# Patient Record
Sex: FEMALE | Race: WHITE | NOT HISPANIC OR LATINO
[De-identification: names, ages, dates, MRNs, and addresses within clinical notes are randomized per-mention and may not be internally consistent; named-entity substitution may affect disease eponyms.]

---

## 2020-02-28 ENCOUNTER — APPOINTMENT (OUTPATIENT)
Dept: OBGYN | Facility: CLINIC | Age: 45
End: 2020-02-28
Payer: COMMERCIAL

## 2020-02-28 VITALS — WEIGHT: 120 LBS | BODY MASS INDEX: 20.49 KG/M2 | HEIGHT: 64 IN

## 2020-02-28 DIAGNOSIS — Z32.02 ENCOUNTER FOR PREGNANCY TEST, RESULT NEGATIVE: ICD-10-CM

## 2020-02-28 DIAGNOSIS — Z78.9 OTHER SPECIFIED HEALTH STATUS: ICD-10-CM

## 2020-02-28 DIAGNOSIS — T85.43XD LEAKAGE OF BREAST PROSTHESIS AND IMPLANT, SUBSEQUENT ENCOUNTER: ICD-10-CM

## 2020-02-28 DIAGNOSIS — T83.32XA DISPLACEMENT OF INTRAUTERINE CONTRACEPTIVE DEVICE, INITIAL ENCOUNTER: ICD-10-CM

## 2020-02-28 DIAGNOSIS — Z80.3 FAMILY HISTORY OF MALIGNANT NEOPLASM OF BREAST: ICD-10-CM

## 2020-02-28 PROCEDURE — 99213 OFFICE O/P EST LOW 20 MIN: CPT | Mod: 25

## 2020-02-28 PROCEDURE — 99386 PREV VISIT NEW AGE 40-64: CPT

## 2020-02-28 PROCEDURE — 99000 SPECIMEN HANDLING OFFICE-LAB: CPT

## 2020-02-28 NOTE — PHYSICAL EXAM
[Alert] : alert [Awake] : awake [Soft] : soft [Oriented x3] : oriented to person, place, and time [Labia Majora] : labia major [No Bleeding] : there was no active vaginal bleeding [Labia Minora] : labia minora [Normal] : clitoris [Pap Obtained] : a Pap smear was performed [Uterine Adnexae] : were not tender and not enlarged [RRR, No Murmurs] : RRR, no murmurs [CTAB] : CTAB [Acute Distress] : no acute distress [Thyroid Nodule] : no thyroid nodule [LAD] : no lymphadenopathy [Tender] : non tender [Goiter] : no goiter [H/Smegaly] : no hepatosplenomegaly [Distended] : not distended [Depressed Mood] : not depressed [Flat Affect] : affect not flat [IUD String] : did not have an IUD string protruding out [Adnexa Tenderness] : were not tender [Motion Tenderness] : there was no cervical motion tenderness [Tenderness] : nontender [Ovarian Mass (___ Cm)] : there were no adnexal masses [FreeTextEntry5] : unable to tease IUD string out of os with cytobrush

## 2020-02-28 NOTE — CHIEF COMPLAINT
[Annual Visit] : annual visit [FreeTextEntry1] : Pt had recent breast surgery d/t implant problem. Sore today, no BP done\par Pt desires STI testing today\par Pt [facility] removal[facility]

## 2020-02-28 NOTE — HISTORY OF PRESENT ILLNESS
[Last Mammogram ___] : Last Mammogram was [unfilled] [Last Pap ___] : Last cervical pap smear was [unfilled] [Reproductive Age] : is of reproductive age [Definite:  ___ (Date)] : the last menstrual period was [unfilled] [Sexually Active] : is not sexually active

## 2020-03-02 ENCOUNTER — APPOINTMENT (OUTPATIENT)
Dept: OBGYN | Facility: CLINIC | Age: 45
End: 2020-03-02
Payer: COMMERCIAL

## 2020-03-02 ENCOUNTER — APPOINTMENT (OUTPATIENT)
Dept: OBGYN | Facility: CLINIC | Age: 45
End: 2020-03-02

## 2020-03-02 ENCOUNTER — ASOB RESULT (OUTPATIENT)
Age: 45
End: 2020-03-02

## 2020-03-02 VITALS — BODY MASS INDEX: 20.49 KG/M2 | WEIGHT: 120 LBS | HEIGHT: 64 IN

## 2020-03-02 DIAGNOSIS — Z97.5 PRESENCE OF (INTRAUTERINE) CONTRACEPTIVE DEVICE: ICD-10-CM

## 2020-03-02 LAB
C TRACH RRNA SPEC QL NAA+PROBE: NOT DETECTED
HBV SURFACE AG SER QL: NONREACTIVE
HCG UR QL: NEGATIVE
HCV AB SER QL: NONREACTIVE
HCV S/CO RATIO: 0.15 S/CO
HIV1+2 AB SPEC QL IA.RAPID: NONREACTIVE
HPV HIGH+LOW RISK DNA PNL CVX: NOT DETECTED
N GONORRHOEA RRNA SPEC QL NAA+PROBE: NOT DETECTED
QUALITY CONTROL: YES
SOURCE TP AMPLIFICATION: NORMAL
T PALLIDUM AB SER QL IA: NEGATIVE

## 2020-03-02 PROCEDURE — 58301 REMOVE INTRAUTERINE DEVICE: CPT

## 2020-03-02 PROCEDURE — 76830 TRANSVAGINAL US NON-OB: CPT

## 2020-03-02 PROCEDURE — 58300 INSERT INTRAUTERINE DEVICE: CPT

## 2020-03-02 PROCEDURE — 76856 US EXAM PELVIC COMPLETE: CPT | Mod: 59

## 2020-03-02 PROCEDURE — ZZZZZ: CPT

## 2020-03-02 RX ORDER — LEVONORGESTREL 52 MG/1
20 INTRAUTERINE DEVICE INTRAUTERINE
Refills: 0 | Status: COMPLETED | OUTPATIENT
Start: 2020-03-02

## 2020-03-02 RX ADMIN — LEVONORGESTREL 0 MCG/24HR: 52 INTRAUTERINE DEVICE INTRAUTERINE at 00:00

## 2020-03-04 ENCOUNTER — APPOINTMENT (OUTPATIENT)
Dept: OBGYN | Facility: CLINIC | Age: 45
End: 2020-03-04

## 2020-03-05 LAB — CYTOLOGY CVX/VAG DOC THIN PREP: NORMAL

## 2020-04-17 ENCOUNTER — APPOINTMENT (OUTPATIENT)
Dept: OBGYN | Facility: CLINIC | Age: 45
End: 2020-04-17

## 2020-12-02 ENCOUNTER — TRANSCRIPTION ENCOUNTER (OUTPATIENT)
Age: 45
End: 2020-12-02

## 2021-01-12 ENCOUNTER — NON-APPOINTMENT (OUTPATIENT)
Age: 46
End: 2021-01-12

## 2021-01-12 ENCOUNTER — APPOINTMENT (OUTPATIENT)
Dept: FAMILY MEDICINE | Facility: CLINIC | Age: 46
End: 2021-01-12
Payer: COMMERCIAL

## 2021-01-12 VITALS
OXYGEN SATURATION: 99 % | TEMPERATURE: 98 F | HEART RATE: 91 BPM | SYSTOLIC BLOOD PRESSURE: 102 MMHG | HEIGHT: 64 IN | WEIGHT: 120 LBS | DIASTOLIC BLOOD PRESSURE: 70 MMHG | RESPIRATION RATE: 16 BRPM | BODY MASS INDEX: 20.49 KG/M2

## 2021-01-12 DIAGNOSIS — M54.2 CERVICALGIA: ICD-10-CM

## 2021-01-12 DIAGNOSIS — Z00.00 ENCOUNTER FOR GENERAL ADULT MEDICAL EXAMINATION W/OUT ABNORMAL FINDINGS: ICD-10-CM

## 2021-01-12 DIAGNOSIS — Z82.3 FAMILY HISTORY OF STROKE: ICD-10-CM

## 2021-01-12 DIAGNOSIS — Z83.438 FAMILY HISTORY OF OTHER DISORDER OF LIPOPROTEIN METABOLISM AND OTHER LIPIDEMIA: ICD-10-CM

## 2021-01-12 DIAGNOSIS — G89.29 CERVICALGIA: ICD-10-CM

## 2021-01-12 LAB
BILIRUB UR QL STRIP: NORMAL
CLARITY UR: CLEAR
COLLECTION METHOD: NORMAL
GLUCOSE UR-MCNC: NORMAL
HCG UR QL: 0.2 EU/DL
HGB UR QL STRIP.AUTO: NORMAL
KETONES UR-MCNC: NORMAL
LEUKOCYTE ESTERASE UR QL STRIP: NORMAL
NITRITE UR QL STRIP: NORMAL
PH UR STRIP: 6
SP GR UR STRIP: 1.01

## 2021-01-12 PROCEDURE — 99072 ADDL SUPL MATRL&STAF TM PHE: CPT

## 2021-01-12 PROCEDURE — 81003 URINALYSIS AUTO W/O SCOPE: CPT | Mod: QW

## 2021-01-12 PROCEDURE — 99396 PREV VISIT EST AGE 40-64: CPT | Mod: 25

## 2021-01-12 PROCEDURE — 36415 COLL VENOUS BLD VENIPUNCTURE: CPT

## 2021-01-12 NOTE — PHYSICAL EXAM
[No Acute Distress] : no acute distress [Well Nourished] : well nourished [Well Developed] : well developed [Well-Appearing] : well-appearing [Normal Sclera/Conjunctiva] : normal sclera/conjunctiva [PERRL] : pupils equal round and reactive to light [EOMI] : extraocular movements intact [Normal Outer Ear/Nose] : the outer ears and nose were normal in appearance [No JVD] : no jugular venous distention [No Lymphadenopathy] : no lymphadenopathy [Supple] : supple [Thyroid Normal, No Nodules] : the thyroid was normal and there were no nodules present [No Respiratory Distress] : no respiratory distress  [No Accessory Muscle Use] : no accessory muscle use [Clear to Auscultation] : lungs were clear to auscultation bilaterally [Normal Rate] : normal rate  [Regular Rhythm] : with a regular rhythm [Normal S1, S2] : normal S1 and S2 [No Murmur] : no murmur heard [No Carotid Bruits] : no carotid bruits [No Abdominal Bruit] : a ~M bruit was not heard ~T in the abdomen [No Varicosities] : no varicosities [Pedal Pulses Present] : the pedal pulses are present [No Edema] : there was no peripheral edema [No Palpable Aorta] : no palpable aorta [No Extremity Clubbing/Cyanosis] : no extremity clubbing/cyanosis [Soft] : abdomen soft [Non Tender] : non-tender [Non-distended] : non-distended [No Masses] : no abdominal mass palpated [No HSM] : no HSM [Normal Bowel Sounds] : normal bowel sounds [Normal Posterior Cervical Nodes] : no posterior cervical lymphadenopathy [Normal Anterior Cervical Nodes] : no anterior cervical lymphadenopathy [No CVA Tenderness] : no CVA  tenderness [No Spinal Tenderness] : no spinal tenderness [No Joint Swelling] : no joint swelling [Grossly Normal Strength/Tone] : grossly normal strength/tone [No Rash] : no rash [Coordination Grossly Intact] : coordination grossly intact [No Focal Deficits] : no focal deficits [Normal Gait] : normal gait [Memory Grossly Normal] : memory grossly normal [Normal Affect] : the affect was normal [Alert and Oriented x3] : oriented to person, place, and time [Normal Insight/Judgement] : insight and judgment were intact [de-identified] : appears anxious

## 2021-01-12 NOTE — HISTORY OF PRESENT ILLNESS
[FreeTextEntry1] : pt states she has restricted movement in base of skull to neck xfew months \par cpe\par discuss anxiety and depression  [de-identified] : Hermila presents for CPE\par She has anxiety and depression symptoms because she is currently living in a hotel with her mother ( waiting for her new home to be ready) and is getting . She does have a good relationship with her boyfriend of 1 year. Does not want prozac as she had prior anorgasmia with it\par Hermila also c/o stiff and sore neck muscles x 3+ months

## 2021-01-12 NOTE — HEALTH RISK ASSESSMENT
[0] : 1) Little interest or pleasure doing things: Not at all (0) [3] : 2) Feeling down, depressed, or hopeless for nearly every day (3)

## 2021-01-13 ENCOUNTER — APPOINTMENT (OUTPATIENT)
Dept: RADIOLOGY | Facility: CLINIC | Age: 46
End: 2021-01-13
Payer: COMMERCIAL

## 2021-01-13 ENCOUNTER — RESULT REVIEW (OUTPATIENT)
Age: 46
End: 2021-01-13

## 2021-01-13 ENCOUNTER — APPOINTMENT (OUTPATIENT)
Dept: MAMMOGRAPHY | Facility: CLINIC | Age: 46
End: 2021-01-13
Payer: COMMERCIAL

## 2021-01-13 ENCOUNTER — TRANSCRIPTION ENCOUNTER (OUTPATIENT)
Age: 46
End: 2021-01-13

## 2021-01-13 ENCOUNTER — APPOINTMENT (OUTPATIENT)
Dept: ULTRASOUND IMAGING | Facility: CLINIC | Age: 46
End: 2021-01-13
Payer: COMMERCIAL

## 2021-01-13 LAB
ALBUMIN SERPL ELPH-MCNC: 4.9 G/DL
ALP BLD-CCNC: 63 U/L
ALT SERPL-CCNC: 18 U/L
ANION GAP SERPL CALC-SCNC: 15 MMOL/L
AST SERPL-CCNC: 23 U/L
BASOPHILS # BLD AUTO: 0.05 K/UL
BASOPHILS NFR BLD AUTO: 0.7 %
BILIRUB SERPL-MCNC: 0.4 MG/DL
BUN SERPL-MCNC: 12 MG/DL
CALCIUM SERPL-MCNC: 10 MG/DL
CHLORIDE SERPL-SCNC: 104 MMOL/L
CHOLEST SERPL-MCNC: 222 MG/DL
CO2 SERPL-SCNC: 21 MMOL/L
CREAT SERPL-MCNC: 0.69 MG/DL
EOSINOPHIL # BLD AUTO: 0.01 K/UL
EOSINOPHIL NFR BLD AUTO: 0.1 %
GLUCOSE SERPL-MCNC: 96 MG/DL
HCT VFR BLD CALC: 43.2 %
HDLC SERPL-MCNC: 114 MG/DL
HGB BLD-MCNC: 13.5 G/DL
IMM GRANULOCYTES NFR BLD AUTO: 0.4 %
LDLC SERPL CALC-MCNC: 98 MG/DL
LYMPHOCYTES # BLD AUTO: 1.42 K/UL
LYMPHOCYTES NFR BLD AUTO: 19.3 %
MAN DIFF?: NORMAL
MCHC RBC-ENTMCNC: 31.3 GM/DL
MCHC RBC-ENTMCNC: 31.3 PG
MCV RBC AUTO: 100 FL
MONOCYTES # BLD AUTO: 0.39 K/UL
MONOCYTES NFR BLD AUTO: 5.3 %
NEUTROPHILS # BLD AUTO: 5.44 K/UL
NEUTROPHILS NFR BLD AUTO: 74.2 %
NONHDLC SERPL-MCNC: 108 MG/DL
PLATELET # BLD AUTO: 304 K/UL
POTASSIUM SERPL-SCNC: 3.9 MMOL/L
PROT SERPL-MCNC: 7.5 G/DL
RBC # BLD: 4.32 M/UL
RBC # FLD: 12.8 %
SODIUM SERPL-SCNC: 140 MMOL/L
T4 FREE SERPL-MCNC: 1 NG/DL
TRIGL SERPL-MCNC: 48 MG/DL
TSH SERPL-ACNC: 0.84 UIU/ML
WBC # FLD AUTO: 7.34 K/UL

## 2021-01-13 PROCEDURE — 77067 SCR MAMMO BI INCL CAD: CPT

## 2021-01-13 PROCEDURE — 76641 ULTRASOUND BREAST COMPLETE: CPT | Mod: 50

## 2021-01-13 PROCEDURE — 77063 BREAST TOMOSYNTHESIS BI: CPT

## 2021-01-13 PROCEDURE — 72050 X-RAY EXAM NECK SPINE 4/5VWS: CPT

## 2021-02-26 ENCOUNTER — TRANSCRIPTION ENCOUNTER (OUTPATIENT)
Age: 46
End: 2021-02-26

## 2021-07-26 ENCOUNTER — APPOINTMENT (OUTPATIENT)
Dept: FAMILY MEDICINE | Facility: CLINIC | Age: 46
End: 2021-07-26
Payer: COMMERCIAL

## 2021-07-26 VITALS
TEMPERATURE: 97.6 F | WEIGHT: 127 LBS | RESPIRATION RATE: 16 BRPM | OXYGEN SATURATION: 99 % | DIASTOLIC BLOOD PRESSURE: 74 MMHG | HEART RATE: 96 BPM | SYSTOLIC BLOOD PRESSURE: 120 MMHG | HEIGHT: 64 IN | BODY MASS INDEX: 21.68 KG/M2

## 2021-07-26 DIAGNOSIS — Z72.89 OTHER PROBLEMS RELATED TO LIFESTYLE: ICD-10-CM

## 2021-07-26 PROCEDURE — 99214 OFFICE O/P EST MOD 30 MIN: CPT

## 2021-07-27 PROBLEM — Z72.89 ALCOHOL DRINKING PROBLEM: Status: ACTIVE | Noted: 2021-07-27

## 2021-07-27 NOTE — HISTORY OF PRESENT ILLNESS
[FreeTextEntry1] : stopped escitalopram back in February, and is really struggling with depression \par [facility]  [de-identified] : Hermila states she is progressively becoming more depressed. She is getting  and is living with her disabled mother x 2 years\par She has been drinking wine every night for several months and has recently began drinking vodka as well\par PMH\par Alcohol abuse in the past\par She is currently speaking with her counsellor and has attended some AA meeting recently. She would like help with alcohol cessation but is unable to go to an inpatient rehab as she is her mothers primary caretaker

## 2021-07-30 ENCOUNTER — APPOINTMENT (OUTPATIENT)
Dept: FAMILY MEDICINE | Facility: CLINIC | Age: 46
End: 2021-07-30

## 2021-09-02 ENCOUNTER — TRANSCRIPTION ENCOUNTER (OUTPATIENT)
Age: 46
End: 2021-09-02

## 2021-09-28 ENCOUNTER — RX RENEWAL (OUTPATIENT)
Age: 46
End: 2021-09-28

## 2021-11-24 ENCOUNTER — APPOINTMENT (OUTPATIENT)
Dept: FAMILY MEDICINE | Facility: CLINIC | Age: 46
End: 2021-11-24
Payer: COMMERCIAL

## 2021-11-24 VITALS
HEART RATE: 86 BPM | RESPIRATION RATE: 14 BRPM | DIASTOLIC BLOOD PRESSURE: 60 MMHG | BODY MASS INDEX: 22.07 KG/M2 | WEIGHT: 129.3 LBS | SYSTOLIC BLOOD PRESSURE: 104 MMHG | HEIGHT: 64 IN | OXYGEN SATURATION: 99 % | TEMPERATURE: 98.8 F

## 2021-11-24 DIAGNOSIS — M67.431 GANGLION, RIGHT WRIST: ICD-10-CM

## 2021-11-24 PROCEDURE — 99213 OFFICE O/P EST LOW 20 MIN: CPT | Mod: 25

## 2021-11-24 NOTE — PHYSICAL EXAM
[Normal] : no joint swelling and grossly normal strength and tone [de-identified] : 1.5 cm right ventral wrist cyst. FROM

## 2021-11-24 NOTE — HISTORY OF PRESENT ILLNESS
[FreeTextEntry1] : Cyst on RT inner wrist\par . Work form completion due 12/2021\par NKDA\par [facility] [de-identified] : Hermila states she has a return of lump on right wrist noticed 4 months ago, slight discomfort \par She has a h/o alcoholism and has sporadically taken Antabuse. She states her last drink was 5 weeks ago and she would like to continue with the medication. She has not contacted her sponsor in recent past

## 2021-11-25 LAB
ALBUMIN SERPL ELPH-MCNC: 4.8 G/DL
ALP BLD-CCNC: 71 U/L
ALT SERPL-CCNC: 22 U/L
ANION GAP SERPL CALC-SCNC: 13 MMOL/L
AST SERPL-CCNC: 22 U/L
BASOPHILS # BLD AUTO: 0.06 K/UL
BASOPHILS NFR BLD AUTO: 0.9 %
BILIRUB SERPL-MCNC: 0.2 MG/DL
BUN SERPL-MCNC: 12 MG/DL
CALCIUM SERPL-MCNC: 9.5 MG/DL
CHLORIDE SERPL-SCNC: 105 MMOL/L
CO2 SERPL-SCNC: 23 MMOL/L
CREAT SERPL-MCNC: 0.92 MG/DL
EOSINOPHIL # BLD AUTO: 0.03 K/UL
EOSINOPHIL NFR BLD AUTO: 0.4 %
GLUCOSE SERPL-MCNC: 87 MG/DL
HCT VFR BLD CALC: 41.4 %
HGB BLD-MCNC: 13.5 G/DL
IMM GRANULOCYTES NFR BLD AUTO: 0.3 %
LYMPHOCYTES # BLD AUTO: 1.41 K/UL
LYMPHOCYTES NFR BLD AUTO: 21.1 %
MAN DIFF?: NORMAL
MCHC RBC-ENTMCNC: 32.4 PG
MCHC RBC-ENTMCNC: 32.6 GM/DL
MCV RBC AUTO: 99.3 FL
MONOCYTES # BLD AUTO: 0.43 K/UL
MONOCYTES NFR BLD AUTO: 6.4 %
NEUTROPHILS # BLD AUTO: 4.74 K/UL
NEUTROPHILS NFR BLD AUTO: 70.9 %
PLATELET # BLD AUTO: 281 K/UL
POTASSIUM SERPL-SCNC: 4.3 MMOL/L
PROT SERPL-MCNC: 6.8 G/DL
RBC # BLD: 4.17 M/UL
RBC # FLD: 11.6 %
SODIUM SERPL-SCNC: 140 MMOL/L
VIT B12 SERPL-MCNC: 585 PG/ML
WBC # FLD AUTO: 6.69 K/UL

## 2021-12-14 ENCOUNTER — TRANSCRIPTION ENCOUNTER (OUTPATIENT)
Age: 46
End: 2021-12-14

## 2021-12-21 ENCOUNTER — APPOINTMENT (OUTPATIENT)
Dept: FAMILY MEDICINE | Facility: CLINIC | Age: 46
End: 2021-12-21
Payer: COMMERCIAL

## 2021-12-21 PROCEDURE — 99211 OFF/OP EST MAY X REQ PHY/QHP: CPT

## 2021-12-23 LAB — SARS-COV-2 N GENE NPH QL NAA+PROBE: NOT DETECTED

## 2022-01-12 ENCOUNTER — TRANSCRIPTION ENCOUNTER (OUTPATIENT)
Age: 47
End: 2022-01-12

## 2022-02-03 ENCOUNTER — APPOINTMENT (OUTPATIENT)
Dept: FAMILY MEDICINE | Facility: CLINIC | Age: 47
End: 2022-02-03
Payer: COMMERCIAL

## 2022-02-03 VITALS
TEMPERATURE: 98.3 F | DIASTOLIC BLOOD PRESSURE: 76 MMHG | SYSTOLIC BLOOD PRESSURE: 110 MMHG | OXYGEN SATURATION: 99 % | WEIGHT: 124.8 LBS | HEIGHT: 64 IN | RESPIRATION RATE: 16 BRPM | BODY MASS INDEX: 21.31 KG/M2 | HEART RATE: 92 BPM

## 2022-02-03 DIAGNOSIS — L84 CORNS AND CALLOSITIES: ICD-10-CM

## 2022-02-03 PROCEDURE — 99213 OFFICE O/P EST LOW 20 MIN: CPT

## 2022-02-03 NOTE — PHYSICAL EXAM
[Coordination Grossly Intact] : coordination grossly intact [No Focal Deficits] : no focal deficits [Normal Gait] : normal gait [Alert and Oriented x3] : oriented to person, place, and time [Normal] : affect was normal and insight and judgment were intact

## 2022-02-03 NOTE — HISTORY OF PRESENT ILLNESS
[FreeTextEntry8] : Headaches x 3 days.  Wants to discuss possibly starting Ubrelvy per a recommendation of her friend\par \par Hermila had h/o migraines when younger but developed generalized pressure and frontal headache x 3 days. She reports photophobia and nausea. It resolved yesterday am. She has an IUD but continues to menstruate with some regularity. She states her depression has resolved and she feels well\par Hermila c/o tender callous of sole of foot near left 5th toe x 1 year\par NKDA\[facility]

## 2022-02-07 ENCOUNTER — TRANSCRIPTION ENCOUNTER (OUTPATIENT)
Age: 47
End: 2022-02-07

## 2022-02-14 ENCOUNTER — RESULT REVIEW (OUTPATIENT)
Age: 47
End: 2022-02-14

## 2022-02-14 ENCOUNTER — APPOINTMENT (OUTPATIENT)
Dept: MAMMOGRAPHY | Facility: CLINIC | Age: 47
End: 2022-02-14
Payer: COMMERCIAL

## 2022-02-14 PROCEDURE — 77063 BREAST TOMOSYNTHESIS BI: CPT

## 2022-02-14 PROCEDURE — 77067 SCR MAMMO BI INCL CAD: CPT

## 2022-03-02 ENCOUNTER — RX RENEWAL (OUTPATIENT)
Age: 47
End: 2022-03-02

## 2022-03-28 ENCOUNTER — RX RENEWAL (OUTPATIENT)
Age: 47
End: 2022-03-28

## 2022-04-11 ENCOUNTER — TRANSCRIPTION ENCOUNTER (OUTPATIENT)
Age: 47
End: 2022-04-11

## 2022-04-13 ENCOUNTER — TRANSCRIPTION ENCOUNTER (OUTPATIENT)
Age: 47
End: 2022-04-13

## 2022-05-05 ENCOUNTER — TRANSCRIPTION ENCOUNTER (OUTPATIENT)
Age: 47
End: 2022-05-05

## 2022-05-09 ENCOUNTER — APPOINTMENT (OUTPATIENT)
Dept: FAMILY MEDICINE | Facility: CLINIC | Age: 47
End: 2022-05-09
Payer: COMMERCIAL

## 2022-05-09 VITALS
BODY MASS INDEX: 19.12 KG/M2 | HEART RATE: 72 BPM | DIASTOLIC BLOOD PRESSURE: 80 MMHG | WEIGHT: 112 LBS | OXYGEN SATURATION: 99 % | SYSTOLIC BLOOD PRESSURE: 116 MMHG | HEIGHT: 64 IN | TEMPERATURE: 98.2 F | RESPIRATION RATE: 16 BRPM

## 2022-05-09 DIAGNOSIS — G62.9 POLYNEUROPATHY, UNSPECIFIED: ICD-10-CM

## 2022-05-09 PROCEDURE — 99214 OFFICE O/P EST MOD 30 MIN: CPT | Mod: 25

## 2022-05-09 NOTE — PHYSICAL EXAM
[No Acute Distress] : no acute distress [Well Nourished] : well nourished [Well Developed] : well developed [Well-Appearing] : well-appearing [Normal Sclera/Conjunctiva] : normal sclera/conjunctiva [PERRL] : pupils equal round and reactive to light [EOMI] : extraocular movements intact [Normal Outer Ear/Nose] : the outer ears and nose were normal in appearance [Normal Oropharynx] : the oropharynx was normal [No JVD] : no jugular venous distention [No Lymphadenopathy] : no lymphadenopathy [Supple] : supple [Thyroid Normal, No Nodules] : the thyroid was normal and there were no nodules present [No Respiratory Distress] : no respiratory distress  [No Accessory Muscle Use] : no accessory muscle use [Clear to Auscultation] : lungs were clear to auscultation bilaterally [Normal Rate] : normal rate  [Regular Rhythm] : with a regular rhythm [Normal S1, S2] : normal S1 and S2 [No Murmur] : no murmur heard [Pedal Pulses Present] : the pedal pulses are present [No Edema] : there was no peripheral edema [Soft] : abdomen soft [Non Tender] : non-tender [Non-distended] : non-distended [Normal Posterior Cervical Nodes] : no posterior cervical lymphadenopathy [Normal Anterior Cervical Nodes] : no anterior cervical lymphadenopathy [No CVA Tenderness] : no CVA  tenderness [No Spinal Tenderness] : no spinal tenderness [No Joint Swelling] : no joint swelling [Grossly Normal Strength/Tone] : grossly normal strength/tone [No Rash] : no rash [Coordination Grossly Intact] : coordination grossly intact [No Focal Deficits] : no focal deficits [Normal Gait] : normal gait [Deep Tendon Reflexes (DTR)] : deep tendon reflexes were 2+ and symmetric [Normal Affect] : the affect was normal [Normal Insight/Judgement] : insight and judgment were intact

## 2022-05-10 ENCOUNTER — APPOINTMENT (OUTPATIENT)
Dept: FAMILY MEDICINE | Facility: CLINIC | Age: 47
End: 2022-05-10
Payer: COMMERCIAL

## 2022-05-10 PROCEDURE — 36415 COLL VENOUS BLD VENIPUNCTURE: CPT

## 2022-05-12 NOTE — REVIEW OF SYSTEMS
[Itching] : Itching [Skin Rash] : skin rash [Headache] : headache [Negative] : Heme/Lymph [de-identified] : tingling

## 2022-05-12 NOTE — HISTORY OF PRESENT ILLNESS
[FreeTextEntry8] : ZENAIDA has tingling and itching in whole body, pain when being touched, states parts of her body feel black and blue but arent, hair falling out, body aches headaches every day. Started the last few weeks ago. \par SHe had yeast infection one week ago but has never had one in her life \par states she gets herpes sores on mouth and had sore on back and felt the same as it feels the same as ones on mouth. ZENAIDA states she has been having increased stress and hair loss. \par NKDA \par [facility]

## 2022-05-16 LAB
ALBUMIN SERPL ELPH-MCNC: 5.1 G/DL
ALP BLD-CCNC: 65 U/L
ALT SERPL-CCNC: 27 U/L
ANION GAP SERPL CALC-SCNC: 12 MMOL/L
AST SERPL-CCNC: 25 U/L
BASOPHILS # BLD AUTO: 0.06 K/UL
BASOPHILS NFR BLD AUTO: 1.1 %
BILIRUB SERPL-MCNC: 0.3 MG/DL
BUN SERPL-MCNC: 12 MG/DL
CALCIUM SERPL-MCNC: 10 MG/DL
CHLORIDE SERPL-SCNC: 107 MMOL/L
CHOLEST SERPL-MCNC: 206 MG/DL
CO2 SERPL-SCNC: 23 MMOL/L
CREAT SERPL-MCNC: 0.79 MG/DL
EGFR: 92 ML/MIN/1.73M2
EOSINOPHIL # BLD AUTO: 0.02 K/UL
EOSINOPHIL NFR BLD AUTO: 0.4 %
ESTIMATED AVERAGE GLUCOSE: 111 MG/DL
FOLATE SERPL-MCNC: 12.8 NG/ML
GLUCOSE SERPL-MCNC: 106 MG/DL
HBA1C MFR BLD HPLC: 5.5 %
HCT VFR BLD CALC: 40.3 %
HDLC SERPL-MCNC: 89 MG/DL
HGB BLD-MCNC: 13 G/DL
IMM GRANULOCYTES NFR BLD AUTO: 0.2 %
LDLC SERPL CALC-MCNC: 107 MG/DL
LYMPHOCYTES # BLD AUTO: 0.87 K/UL
LYMPHOCYTES NFR BLD AUTO: 16.7 %
MAN DIFF?: NORMAL
MCHC RBC-ENTMCNC: 31.1 PG
MCHC RBC-ENTMCNC: 32.3 GM/DL
MCV RBC AUTO: 96.4 FL
MONOCYTES # BLD AUTO: 0.34 K/UL
MONOCYTES NFR BLD AUTO: 6.5 %
NEUTROPHILS # BLD AUTO: 3.92 K/UL
NEUTROPHILS NFR BLD AUTO: 75.1 %
NONHDLC SERPL-MCNC: 116 MG/DL
PLATELET # BLD AUTO: 284 K/UL
POTASSIUM SERPL-SCNC: 5 MMOL/L
PROT SERPL-MCNC: 7.1 G/DL
RBC # BLD: 4.18 M/UL
RBC # FLD: 12.9 %
SODIUM SERPL-SCNC: 142 MMOL/L
TRIGL SERPL-MCNC: 44 MG/DL
TSH SERPL-ACNC: 1.12 UIU/ML
VIT B12 SERPL-MCNC: 476 PG/ML
WBC # FLD AUTO: 5.22 K/UL

## 2022-05-20 ENCOUNTER — TRANSCRIPTION ENCOUNTER (OUTPATIENT)
Age: 47
End: 2022-05-20

## 2022-05-20 LAB — VZV IGM SER IF-ACNC: <0.91 INDEX

## 2022-07-23 ENCOUNTER — TRANSCRIPTION ENCOUNTER (OUTPATIENT)
Age: 47
End: 2022-07-23

## 2022-07-28 ENCOUNTER — TRANSCRIPTION ENCOUNTER (OUTPATIENT)
Age: 47
End: 2022-07-28

## 2022-08-12 ENCOUNTER — TRANSCRIPTION ENCOUNTER (OUTPATIENT)
Age: 47
End: 2022-08-12

## 2022-08-22 ENCOUNTER — RX RENEWAL (OUTPATIENT)
Age: 47
End: 2022-08-22

## 2022-08-29 ENCOUNTER — LABORATORY RESULT (OUTPATIENT)
Age: 47
End: 2022-08-29

## 2022-08-29 ENCOUNTER — APPOINTMENT (OUTPATIENT)
Dept: OBGYN | Facility: CLINIC | Age: 47
End: 2022-08-29

## 2022-08-29 DIAGNOSIS — R92.2 INCONCLUSIVE MAMMOGRAM: ICD-10-CM

## 2022-08-29 DIAGNOSIS — N89.8 OTHER SPECIFIED NONINFLAMMATORY DISORDERS OF VAGINA: ICD-10-CM

## 2022-08-29 PROCEDURE — 99396 PREV VISIT EST AGE 40-64: CPT

## 2022-08-29 NOTE — HISTORY OF PRESENT ILLNESS
[Patient reported mammogram was normal] : Patient reported mammogram was normal [Patient reported breast sonogram was normal] : Patient reported breast sonogram was normal [Patient reported PAP Smear was normal] : Patient reported PAP Smear was normal [HIV Test offered] : HIV Test offered [Syphilis test offered] : Syphilis test offered [Gonorrhea test offered] : Gonorrhea test offered [Chlamydia test offered] : Chlamydia test offered [HPV test offered] : HPV test offered [IUD] : has an intrauterine device [Y] : Positive pregnancy history [TextBox_4] : 48 yo  for annual exam today.Reports vaginal itching and odor 2 weeks ago, used Monistat, now resolved.  Would like full STD screen as recently  and was with new partner. No significant gyn history.  Mirena  IUD [Mammogramdate] : 2/14/22 [BreastSonogramDate] : 1/13/21 [PapSmeardate] : 2/28/20 [LMPDate] : Mirena IUD [PGHxTotal] : 3 [[facility]FullTerm] : 2 [[facility]] : 2 [PGHxABSpont] : 1

## 2022-08-29 NOTE — DISCUSSION/SUMMARY
[FreeTextEntry1] : unremarkable CBE and pelvic exams\par SBE taught and encouraged monthly\par Pap collected\par GC/CT/HIV/RPR ordered\par Mammo and Breast US ordered\par  I reviewed measures for maintaining optimal bone density including dietary intake of 1200 mg calcium and 800 units  of vitamin D daily and 30 minutes of weight bearing exercise for a minimum of 3 x weekly.  Patient given a list of dietary sources of calcium and vitamin D.  Patient verbalizes understanding of these recommendations\par The importance of a screening colonoscopy was discussed.\par

## 2022-08-30 LAB
C TRACH RRNA SPEC QL NAA+PROBE: NOT DETECTED
HPV HIGH+LOW RISK DNA PNL CVX: NOT DETECTED
N GONORRHOEA RRNA SPEC QL NAA+PROBE: NOT DETECTED
SOURCE AMPLIFICATION: NORMAL

## 2022-08-31 ENCOUNTER — TRANSCRIPTION ENCOUNTER (OUTPATIENT)
Age: 47
End: 2022-08-31

## 2022-08-31 LAB
CANDIDA VAG CYTO: NOT DETECTED
G VAGINALIS+PREV SP MTYP VAG QL MICRO: DETECTED
T VAGINALIS VAG QL WET PREP: NOT DETECTED

## 2022-09-06 ENCOUNTER — OFFICE (OUTPATIENT)
Dept: URBAN - METROPOLITAN AREA CLINIC 38 | Facility: CLINIC | Age: 47
Setting detail: OPHTHALMOLOGY
End: 2022-09-06
Payer: COMMERCIAL

## 2022-09-06 DIAGNOSIS — H52.13: ICD-10-CM

## 2022-09-06 PROCEDURE — SCREF LASIK EVAL: Performed by: OPTOMETRIST

## 2022-09-06 ASSESSMENT — AXIALLENGTH_DERIVED
OD_AL: 22.5214
OS_AL: 22.4897

## 2022-09-06 ASSESSMENT — REFRACTION_AUTOREFRACTION
OD_CYLINDER: -0.25
OS_SPHERE: +0.50
OS_AXIS: 035
OD_SPHERE: +1.00
OS_CYLINDER: -0.25
OD_AXIS: 100

## 2022-09-06 ASSESSMENT — SPHEQUIV_DERIVED
OS_SPHEQUIV: 0.375
OD_SPHEQUIV: 0.875

## 2022-09-06 ASSESSMENT — PACHYMETRY
OS_CT_CORRECTION: 3
OS_CT_UM: 508
OD_CT_UM: 506
OD_CT_CORRECTION: 3

## 2022-09-06 ASSESSMENT — CONFRONTATIONAL VISUAL FIELD TEST (CVF)
OS_FINDINGS: FULL
OD_FINDINGS: FULL

## 2022-09-06 ASSESSMENT — KERATOMETRY
OD_AXISANGLE_DEGREES: 003
OD_K2POWER_DIOPTERS: 46.00
OS_K2POWER_DIOPTERS: 46.25
OD_K1POWER_DIOPTERS: 45.25
OS_AXISANGLE_DEGREES: 090
OS_K1POWER_DIOPTERS: 46.25

## 2022-09-06 ASSESSMENT — TONOMETRY
OD_IOP_MMHG: 19
OS_IOP_MMHG: 20

## 2022-09-06 ASSESSMENT — VISUAL ACUITY
OD_BCVA: 20/20
OS_BCVA: 20/20

## 2022-09-07 LAB — CYTOLOGY CVX/VAG DOC THIN PREP: ABNORMAL

## 2022-09-12 ENCOUNTER — TRANSCRIPTION ENCOUNTER (OUTPATIENT)
Age: 47
End: 2022-09-12

## 2022-09-13 ENCOUNTER — APPOINTMENT (OUTPATIENT)
Dept: FAMILY MEDICINE | Facility: CLINIC | Age: 47
End: 2022-09-13

## 2022-09-13 VITALS
OXYGEN SATURATION: 99 % | DIASTOLIC BLOOD PRESSURE: 62 MMHG | HEART RATE: 74 BPM | HEIGHT: 64 IN | SYSTOLIC BLOOD PRESSURE: 98 MMHG | TEMPERATURE: 97.8 F | RESPIRATION RATE: 16 BRPM | WEIGHT: 114.5 LBS | BODY MASS INDEX: 19.55 KG/M2

## 2022-09-13 DIAGNOSIS — N39.0 URINARY TRACT INFECTION, SITE NOT SPECIFIED: ICD-10-CM

## 2022-09-13 DIAGNOSIS — Z23 ENCOUNTER FOR IMMUNIZATION: ICD-10-CM

## 2022-09-13 DIAGNOSIS — B00.1 HERPESVIRAL VESICULAR DERMATITIS: ICD-10-CM

## 2022-09-13 PROCEDURE — 99213 OFFICE O/P EST LOW 20 MIN: CPT

## 2022-09-13 RX ORDER — ESCITALOPRAM OXALATE 10 MG/1
10 TABLET ORAL
Qty: 30 | Refills: 2 | Status: DISCONTINUED | COMMUNITY
Start: 2021-01-12 | End: 2022-09-13

## 2022-09-13 RX ORDER — TRAZODONE HYDROCHLORIDE 50 MG/1
50 TABLET ORAL
Qty: 30 | Refills: 0 | Status: DISCONTINUED | COMMUNITY
Start: 2021-12-14 | End: 2022-09-13

## 2022-09-13 RX ORDER — UBROGEPANT 50 MG/1
50 TABLET ORAL
Qty: 10 | Refills: 1 | Status: DISCONTINUED | COMMUNITY
Start: 2022-04-13 | End: 2022-09-13

## 2022-09-13 RX ORDER — ACYCLOVIR 400 MG/1
400 TABLET ORAL 3 TIMES DAILY
Qty: 15 | Refills: 0 | Status: DISCONTINUED | COMMUNITY
Start: 2021-02-26 | End: 2022-09-13

## 2022-09-13 NOTE — HISTORY OF PRESENT ILLNESS
[FreeTextEntry1] : Follow up for refills on Disulfiram. Discuss valcyclovir. \par NKDA\par [facility] [de-identified] : Hermila last drank alcohol October 2021. 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

## 2022-12-16 ENCOUNTER — RX RENEWAL (OUTPATIENT)
Age: 47
End: 2022-12-16

## 2023-01-22 ENCOUNTER — RX RENEWAL (OUTPATIENT)
Age: 48
End: 2023-01-22

## 2023-03-15 ENCOUNTER — RESULT REVIEW (OUTPATIENT)
Age: 48
End: 2023-03-15

## 2023-03-15 ENCOUNTER — APPOINTMENT (OUTPATIENT)
Dept: FAMILY MEDICINE | Facility: CLINIC | Age: 48
End: 2023-03-15
Payer: COMMERCIAL

## 2023-03-15 VITALS
DIASTOLIC BLOOD PRESSURE: 78 MMHG | HEART RATE: 85 BPM | RESPIRATION RATE: 16 BRPM | HEIGHT: 64 IN | OXYGEN SATURATION: 99 % | SYSTOLIC BLOOD PRESSURE: 110 MMHG | TEMPERATURE: 98.1 F | WEIGHT: 121 LBS | BODY MASS INDEX: 20.66 KG/M2

## 2023-03-15 DIAGNOSIS — F10.21 ALCOHOL DEPENDENCE, IN REMISSION: ICD-10-CM

## 2023-03-15 PROCEDURE — G0444 DEPRESSION SCREEN ANNUAL: CPT | Mod: 59

## 2023-03-15 PROCEDURE — 99214 OFFICE O/P EST MOD 30 MIN: CPT | Mod: 25

## 2023-03-15 NOTE — HISTORY OF PRESENT ILLNESS
[FreeTextEntry1] : Hermila needs refill disulfiram and rizatriptan  [de-identified] : Hermila c/o many changes in her lift over the past year and she is becoming depress\par She states her mother was placed in assisted living, she is not currently working and she broke up with her boyfriend. She has not began drinking alcohol but requests refill of antabuse and migraine med

## 2023-03-15 NOTE — PHYSICAL EXAM
[Normal] : no joint swelling and grossly normal strength and tone [Alert and Oriented x3] : oriented to person, place, and time [Normal Insight/Judgement] : insight and judgment were intact [de-identified] : appears stressed, depressed mood

## 2023-06-19 ENCOUNTER — RX RENEWAL (OUTPATIENT)
Age: 48
End: 2023-06-19

## 2023-08-30 ENCOUNTER — APPOINTMENT (OUTPATIENT)
Dept: OBGYN | Facility: CLINIC | Age: 48
End: 2023-08-30
Payer: COMMERCIAL

## 2023-08-30 VITALS
DIASTOLIC BLOOD PRESSURE: 79 MMHG | BODY MASS INDEX: 20.66 KG/M2 | WEIGHT: 121 LBS | SYSTOLIC BLOOD PRESSURE: 118 MMHG | HEIGHT: 64 IN

## 2023-08-30 DIAGNOSIS — Z01.419 ENCOUNTER FOR GYNECOLOGICAL EXAMINATION (GENERAL) (ROUTINE) W/OUT ABNORMAL FINDINGS: ICD-10-CM

## 2023-08-30 DIAGNOSIS — Z11.3 ENCOUNTER FOR SCREENING FOR INFECTIONS WITH A PREDOMINANTLY SEXUAL MODE OF TRANSMISSION: ICD-10-CM

## 2023-08-30 LAB
HCG UR QL: NEGATIVE
QUALITY CONTROL: YES

## 2023-08-30 PROCEDURE — 99396 PREV VISIT EST AGE 40-64: CPT

## 2023-08-30 NOTE — PLAN
[FreeTextEntry1] : unremarkable CBE and pelvic exams SBE taught and encouraged monthly Pap/HPV collected Mammo ordered Full STI screen I reviewed dietary, vitamin and exercise measures for maintaining optimal bone density and patient verbalizes understanding of these recommendations. Healthy diet,exercise and sleep hygiene discussed The importance of a screening colonoscopy was discussed.

## 2023-08-30 NOTE — HISTORY OF PRESENT ILLNESS
[HIV Test offered] : HIV Test offered [Syphilis test offered] : Syphilis test offered [Gonorrhea test offered] : Gonorrhea test offered [Chlamydia test offered] : Chlamydia test offered [HPV test offered] : HPV test offered [N] : Patient reports normal menses [IUD] : has an intrauterine device [Monogamous (Male Partner)] : is monogamous with a male partner [Y] : Positive pregnancy history [TextBox_4] : 47 yo  for annual exam today. No hx abnormal pap smears, abnormal bleeding,fibroids, cysts.  No urinary complaints.  Past medical, surgical, social and family hx reviewed and updated.   No gyn concerns today. Reuests fulll STI screening.  With new partner for past year and he had full negative STI screening. [Mammogramdate] : 12/22 [BreastSonogramDate] : 1/21 [PapSmeardate] : 08/22 [LMPDate] : 05/23 [de-identified] : Mirena - collins 3/2020 [PGHxTotal] : 3 [[facility]] : 2 [[facility]] : 2 [PGHxABSpont] : 1

## 2023-08-31 LAB
C TRACH RRNA SPEC QL NAA+PROBE: NOT DETECTED
HIV1+2 AB SPEC QL IA.RAPID: NONREACTIVE
HPV HIGH+LOW RISK DNA PNL CVX: NOT DETECTED
N GONORRHOEA RRNA SPEC QL NAA+PROBE: NOT DETECTED
SOURCE TP AMPLIFICATION: NORMAL
T PALLIDUM AB SER QL IA: NEGATIVE

## 2023-09-01 ENCOUNTER — RESULT REVIEW (OUTPATIENT)
Age: 48
End: 2023-09-01

## 2023-09-01 ENCOUNTER — APPOINTMENT (OUTPATIENT)
Dept: MAMMOGRAPHY | Facility: CLINIC | Age: 48
End: 2023-09-01
Payer: COMMERCIAL

## 2023-09-01 PROCEDURE — 77063 BREAST TOMOSYNTHESIS BI: CPT

## 2023-09-01 PROCEDURE — 77067 SCR MAMMO BI INCL CAD: CPT

## 2023-09-02 DIAGNOSIS — R92.8 OTHER ABNORMAL AND INCONCLUSIVE FINDINGS ON DIAGNOSTIC IMAGING OF BREAST: ICD-10-CM

## 2023-09-03 PROBLEM — R92.8 ABNORMAL MAMMOGRAM OF RIGHT BREAST: Status: ACTIVE | Noted: 2023-09-03

## 2023-09-06 LAB — CYTOLOGY CVX/VAG DOC THIN PREP: ABNORMAL

## 2023-09-15 ENCOUNTER — APPOINTMENT (OUTPATIENT)
Dept: MAMMOGRAPHY | Facility: CLINIC | Age: 48
End: 2023-09-15
Payer: COMMERCIAL

## 2023-09-15 ENCOUNTER — RESULT REVIEW (OUTPATIENT)
Age: 48
End: 2023-09-15

## 2023-09-15 PROCEDURE — 77065 DX MAMMO INCL CAD UNI: CPT | Mod: RT

## 2023-09-26 ENCOUNTER — TRANSCRIPTION ENCOUNTER (OUTPATIENT)
Age: 48
End: 2023-09-26

## 2023-10-02 ENCOUNTER — APPOINTMENT (OUTPATIENT)
Dept: FAMILY MEDICINE | Facility: CLINIC | Age: 48
End: 2023-10-02
Payer: COMMERCIAL

## 2023-10-02 ENCOUNTER — NON-APPOINTMENT (OUTPATIENT)
Age: 48
End: 2023-10-02

## 2023-10-02 VITALS
OXYGEN SATURATION: 99 % | HEIGHT: 64 IN | HEART RATE: 68 BPM | TEMPERATURE: 97.8 F | SYSTOLIC BLOOD PRESSURE: 108 MMHG | BODY MASS INDEX: 19.81 KG/M2 | DIASTOLIC BLOOD PRESSURE: 74 MMHG | WEIGHT: 116 LBS | RESPIRATION RATE: 16 BRPM

## 2023-10-02 DIAGNOSIS — G43.909 MIGRAINE, UNSPECIFIED, NOT INTRACTABLE, W/OUT STATUS MIGRAINOSUS: ICD-10-CM

## 2023-10-02 DIAGNOSIS — G47.00 INSOMNIA, UNSPECIFIED: ICD-10-CM

## 2023-10-02 DIAGNOSIS — F41.8 OTHER SPECIFIED ANXIETY DISORDERS: ICD-10-CM

## 2023-10-02 PROCEDURE — 93000 ELECTROCARDIOGRAM COMPLETE: CPT

## 2023-10-02 PROCEDURE — 99214 OFFICE O/P EST MOD 30 MIN: CPT | Mod: 25

## 2023-10-02 RX ORDER — RIZATRIPTAN BENZOATE 10 MG/1
10 TABLET ORAL
Qty: 6 | Refills: 5 | Status: DISCONTINUED | COMMUNITY
Start: 2022-02-03 | End: 2023-10-02

## 2023-10-02 RX ORDER — ZOLPIDEM TARTRATE 12.5 MG/1
12.5 TABLET, EXTENDED RELEASE ORAL
Qty: 30 | Refills: 3 | Status: ACTIVE | COMMUNITY
Start: 2023-10-02 | End: 1900-01-01

## 2023-10-02 RX ORDER — BUPROPION HYDROCHLORIDE 150 MG/1
150 TABLET, EXTENDED RELEASE ORAL
Qty: 30 | Refills: 3 | Status: DISCONTINUED | COMMUNITY
Start: 2023-03-15 | End: 2023-10-02

## 2023-10-02 RX ORDER — CLINDAMYCIN HYDROCHLORIDE 300 MG/1
300 CAPSULE ORAL
Qty: 14 | Refills: 0 | Status: DISCONTINUED | COMMUNITY
Start: 2022-09-12 | End: 2023-10-02

## 2023-10-02 RX ORDER — CLINDAMYCIN HYDROCHLORIDE 300 MG/1
300 CAPSULE ORAL
Qty: 14 | Refills: 0 | Status: DISCONTINUED | COMMUNITY
Start: 2022-08-31 | End: 2023-10-02

## 2023-10-02 RX ORDER — ZOLPIDEM TARTRATE 10 MG/1
10 TABLET ORAL
Qty: 30 | Refills: 2 | Status: ACTIVE | COMMUNITY
Start: 2023-10-02 | End: 1900-01-01

## 2023-10-05 ENCOUNTER — APPOINTMENT (OUTPATIENT)
Dept: CARDIOLOGY | Facility: CLINIC | Age: 48
End: 2023-10-05
Payer: COMMERCIAL

## 2023-10-05 VITALS
BODY MASS INDEX: 19.16 KG/M2 | HEART RATE: 92 BPM | SYSTOLIC BLOOD PRESSURE: 114 MMHG | OXYGEN SATURATION: 97 % | DIASTOLIC BLOOD PRESSURE: 60 MMHG | WEIGHT: 115 LBS | HEIGHT: 65 IN

## 2023-10-05 VITALS — DIASTOLIC BLOOD PRESSURE: 60 MMHG | SYSTOLIC BLOOD PRESSURE: 110 MMHG

## 2023-10-05 DIAGNOSIS — R42 DIZZINESS AND GIDDINESS: ICD-10-CM

## 2023-10-05 DIAGNOSIS — Z86.69 PERSONAL HISTORY OF OTHER DISEASES OF THE NERVOUS SYSTEM AND SENSE ORGANS: ICD-10-CM

## 2023-10-05 DIAGNOSIS — R94.31 ABNORMAL ELECTROCARDIOGRAM [ECG] [EKG]: ICD-10-CM

## 2023-10-05 DIAGNOSIS — Z13.6 ENCOUNTER FOR SCREENING FOR CARDIOVASCULAR DISORDERS: ICD-10-CM

## 2023-10-05 PROCEDURE — 99204 OFFICE O/P NEW MOD 45 MIN: CPT

## 2023-10-05 RX ORDER — VALACYCLOVIR 1 G/1
1 TABLET, FILM COATED ORAL
Qty: 30 | Refills: 0 | Status: DISCONTINUED | COMMUNITY
Start: 2023-05-24 | End: 2023-10-05

## 2023-10-08 LAB
ALBUMIN SERPL ELPH-MCNC: 5 G/DL
ALP BLD-CCNC: 53 U/L
ALT SERPL-CCNC: 11 U/L
ANION GAP SERPL CALC-SCNC: 12 MMOL/L
AST SERPL-CCNC: 18 U/L
BASOPHILS # BLD AUTO: 0.07 K/UL
BASOPHILS NFR BLD AUTO: 1.1 %
BILIRUB SERPL-MCNC: 0.3 MG/DL
BUN SERPL-MCNC: 10 MG/DL
CALCIUM SERPL-MCNC: 9.8 MG/DL
CHLORIDE SERPL-SCNC: 104 MMOL/L
CHOLEST SERPL-MCNC: 242 MG/DL
CO2 SERPL-SCNC: 24 MMOL/L
CREAT SERPL-MCNC: 0.73 MG/DL
EGFR: 101 ML/MIN/1.73M2
EOSINOPHIL # BLD AUTO: 0.03 K/UL
EOSINOPHIL NFR BLD AUTO: 0.5 %
ESTIMATED AVERAGE GLUCOSE: 100 MG/DL
GLUCOSE SERPL-MCNC: 94 MG/DL
HBA1C MFR BLD HPLC: 5.1 %
HCT VFR BLD CALC: 39.7 %
HDLC SERPL-MCNC: 108 MG/DL
HGB BLD-MCNC: 12.9 G/DL
IMM GRANULOCYTES NFR BLD AUTO: 0.3 %
LDLC SERPL CALC-MCNC: 127 MG/DL
LYMPHOCYTES # BLD AUTO: 1.3 K/UL
LYMPHOCYTES NFR BLD AUTO: 21.3 %
MAN DIFF?: NORMAL
MCHC RBC-ENTMCNC: 32.1 PG
MCHC RBC-ENTMCNC: 32.5 GM/DL
MCV RBC AUTO: 98.8 FL
MONOCYTES # BLD AUTO: 0.48 K/UL
MONOCYTES NFR BLD AUTO: 7.9 %
NEUTROPHILS # BLD AUTO: 4.2 K/UL
NEUTROPHILS NFR BLD AUTO: 68.9 %
NONHDLC SERPL-MCNC: 133 MG/DL
PLATELET # BLD AUTO: 320 K/UL
POTASSIUM SERPL-SCNC: 4.1 MMOL/L
PROT SERPL-MCNC: 7.3 G/DL
RBC # BLD: 4.02 M/UL
RBC # FLD: 12.4 %
SODIUM SERPL-SCNC: 140 MMOL/L
TRIGL SERPL-MCNC: 39 MG/DL
TSH SERPL-ACNC: 0.88 UIU/ML
WBC # FLD AUTO: 6.1 K/UL

## 2023-10-10 ENCOUNTER — TRANSCRIPTION ENCOUNTER (OUTPATIENT)
Age: 48
End: 2023-10-10

## 2023-10-17 ENCOUNTER — RESULT REVIEW (OUTPATIENT)
Age: 48
End: 2023-10-17

## 2023-10-17 ENCOUNTER — APPOINTMENT (OUTPATIENT)
Dept: ULTRASOUND IMAGING | Facility: CLINIC | Age: 48
End: 2023-10-17
Payer: COMMERCIAL

## 2023-10-17 PROCEDURE — 93970 EXTREMITY STUDY: CPT

## 2023-10-27 ENCOUNTER — TRANSCRIPTION ENCOUNTER (OUTPATIENT)
Age: 48
End: 2023-10-27

## 2023-10-31 ENCOUNTER — TRANSCRIPTION ENCOUNTER (OUTPATIENT)
Age: 48
End: 2023-10-31

## 2023-12-05 ENCOUNTER — TRANSCRIPTION ENCOUNTER (OUTPATIENT)
Age: 48
End: 2023-12-05

## 2023-12-21 ENCOUNTER — APPOINTMENT (OUTPATIENT)
Dept: CARDIOLOGY | Facility: CLINIC | Age: 48
End: 2023-12-21

## 2024-01-08 ENCOUNTER — TRANSCRIPTION ENCOUNTER (OUTPATIENT)
Age: 49
End: 2024-01-08

## 2024-02-10 ENCOUNTER — RX RENEWAL (OUTPATIENT)
Age: 49
End: 2024-02-10

## 2024-02-10 RX ORDER — DISULFIRAM 250 MG/1
250 TABLET ORAL
Qty: 30 | Refills: 2 | Status: ACTIVE | COMMUNITY
Start: 2021-07-26 | End: 1900-01-01

## 2024-02-14 ENCOUNTER — RESULT REVIEW (OUTPATIENT)
Age: 49
End: 2024-02-14

## 2024-02-14 DIAGNOSIS — B00.2 HERPESVIRAL GINGIVOSTOMATITIS AND PHARYNGOTONSILLITIS: ICD-10-CM

## 2024-02-14 RX ORDER — VALACYCLOVIR 500 MG/1
500 TABLET, FILM COATED ORAL
Qty: 30 | Refills: 4 | Status: ACTIVE | COMMUNITY
Start: 2024-02-14 | End: 1900-01-01

## 2024-03-06 ENCOUNTER — APPOINTMENT (OUTPATIENT)
Dept: OBGYN | Facility: CLINIC | Age: 49
End: 2024-03-06
Payer: COMMERCIAL

## 2024-03-06 VITALS
DIASTOLIC BLOOD PRESSURE: 73 MMHG | WEIGHT: 113 LBS | BODY MASS INDEX: 18.83 KG/M2 | SYSTOLIC BLOOD PRESSURE: 117 MMHG | HEIGHT: 65 IN

## 2024-03-06 DIAGNOSIS — N89.8 OTHER SPECIFIED NONINFLAMMATORY DISORDERS OF VAGINA: ICD-10-CM

## 2024-03-06 LAB
BILIRUB UR QL STRIP: NORMAL
GLUCOSE UR-MCNC: NORMAL
HCG UR QL: 0.2 EU/DL
HGB UR QL STRIP.AUTO: NORMAL
KETONES UR-MCNC: NORMAL
LEUKOCYTE ESTERASE UR QL STRIP: NORMAL
NITRITE UR QL STRIP: NORMAL
PH UR STRIP: 6
PROT UR STRIP-MCNC: NORMAL
SP GR UR STRIP: 1.01

## 2024-03-06 PROCEDURE — 99213 OFFICE O/P EST LOW 20 MIN: CPT

## 2024-03-06 PROCEDURE — 81003 URINALYSIS AUTO W/O SCOPE: CPT | Mod: QW

## 2024-03-06 RX ORDER — FLUCONAZOLE 150 MG/1
150 TABLET ORAL
Qty: 2 | Refills: 2 | Status: ACTIVE | COMMUNITY
Start: 2024-03-06 | End: 1900-01-01

## 2024-03-06 NOTE — REVIEW OF SYSTEMS
[FreeTextEntry8] : vaginal discharge [Patient Intake Form Reviewed] : Patient intake form was reviewed

## 2024-03-06 NOTE — PHYSICAL EXAM
[Appropriately responsive] : appropriately responsive [No Acute Distress] : no acute distress [Alert] : alert [Oriented x3] : oriented x3 [Labia Minora] : normal [Labia Majora] : normal [Discharge] : a  ~M vaginal discharge was present [Moderate] : moderate [Foul Smelling] : not foul smelling [White] : white [Normal] : normal [Cheesy] : cheesy

## 2024-03-06 NOTE — PLAN
[FreeTextEntry1] : Exam most c/w vaginal candidiasis vs leukorrhea BD Affirm Vaginitis plus  Will treat empirically with Diflucan Will follow up with results other than candidiasis  Patient verbalizes understanding of and agreement with this plan.  All questions answered to patient's satisfaction.

## 2024-03-06 NOTE — HISTORY OF PRESENT ILLNESS
[FreeTextEntry1] : 48 yo presents with history of odorless white clumpy vaginal discharge for past two days.  She denies vagina itching or recent antibiotic use.   [Gonorrhea test offered] : Gonorrhea test offered [Chlamydia test offered] : Chlamydia test offered [Trichomonas test offered] : Trichomonas test offered

## 2024-03-07 DIAGNOSIS — O23.599 INFECTION OF OTHER PART OF GENITAL TRACT IN PREGNANCY, UNSPECIFIED TRIMESTER: ICD-10-CM

## 2024-03-07 DIAGNOSIS — B96.89 INFECTION OF OTHER PART OF GENITAL TRACT IN PREGNANCY, UNSPECIFIED TRIMESTER: ICD-10-CM

## 2024-03-07 RX ORDER — METRONIDAZOLE 7.5 MG/G
0.75 GEL VAGINAL
Qty: 1 | Refills: 0 | Status: ACTIVE | COMMUNITY
Start: 2024-03-07

## 2024-03-11 LAB
A VAGINAE DNA VAG QL NAA+PROBE: ABNORMAL
BVAB2 DNA VAG QL NAA+PROBE: ABNORMAL
C KRUSEI DNA VAG QL NAA+PROBE: NEGATIVE
C TRACH RRNA SPEC QL NAA+PROBE: NEGATIVE
CANDIDA DNA VAG QL NAA+PROBE: NEGATIVE
MEGA1 DNA VAG QL NAA+PROBE: NORMAL
N GONORRHOEA RRNA SPEC QL NAA+PROBE: NEGATIVE
T VAGINALIS RRNA SPEC QL NAA+PROBE: NEGATIVE

## 2024-03-14 ENCOUNTER — APPOINTMENT (OUTPATIENT)
Dept: MAMMOGRAPHY | Facility: CLINIC | Age: 49
End: 2024-03-14
Payer: COMMERCIAL

## 2024-03-14 ENCOUNTER — RESULT REVIEW (OUTPATIENT)
Age: 49
End: 2024-03-14

## 2024-03-14 DIAGNOSIS — B96.89 ACUTE VAGINITIS: ICD-10-CM

## 2024-03-14 DIAGNOSIS — N76.0 ACUTE VAGINITIS: ICD-10-CM

## 2024-03-14 PROCEDURE — 77065 DX MAMMO INCL CAD UNI: CPT | Mod: RT

## 2024-03-14 RX ORDER — METRONIDAZOLE 500 MG/1
500 TABLET ORAL TWICE DAILY
Qty: 14 | Refills: 0 | Status: ACTIVE | COMMUNITY
Start: 2024-03-14 | End: 1900-01-01

## 2024-03-14 RX ORDER — METRONIDAZOLE 500 MG/1
500 TABLET ORAL
Qty: 14 | Refills: 1 | Status: ACTIVE | COMMUNITY
Start: 2024-03-14 | End: 1900-01-01

## 2024-03-18 ENCOUNTER — RX RENEWAL (OUTPATIENT)
Age: 49
End: 2024-03-18

## 2024-06-10 ENCOUNTER — APPOINTMENT (OUTPATIENT)
Dept: OBGYN | Facility: CLINIC | Age: 49
End: 2024-06-10
Payer: COMMERCIAL

## 2024-06-10 VITALS
BODY MASS INDEX: 18.66 KG/M2 | WEIGHT: 112 LBS | DIASTOLIC BLOOD PRESSURE: 76 MMHG | SYSTOLIC BLOOD PRESSURE: 111 MMHG | HEIGHT: 65 IN

## 2024-06-10 DIAGNOSIS — Z30.431 ENCOUNTER FOR ROUTINE CHECKING OF INTRAUTERINE CONTRACEPTIVE DEVICE: ICD-10-CM

## 2024-06-10 PROCEDURE — 99213 OFFICE O/P EST LOW 20 MIN: CPT

## 2024-06-10 NOTE — PHYSICAL EXAM
[Appropriately responsive] : appropriately responsive [Alert] : alert [No Acute Distress] : no acute distress [Oriented x3] : oriented x3 [Labia Majora] : normal [Labia Minora] : normal [Normal] : normal [IUD String] : an IUD string was noted

## 2024-06-10 NOTE — DISCUSSION/SUMMARY
[FreeTextEntry1] : Mirena strings approx 1.5 cm long and tangled up at os Strings were trimmed to .5 cm Patient to RTO if partner's pain continues  Patient verbalizes understanding of and agreement with this plan.  All questions answered to patient's satisfaction.

## 2024-06-10 NOTE — HISTORY OF PRESENT ILLNESS
[FreeTextEntry1] : Patient presents to evaluate IUD strings which her partner is finding painful.  Mirena IUD in situ since 2020.

## 2024-06-20 ENCOUNTER — TRANSCRIPTION ENCOUNTER (OUTPATIENT)
Age: 49
End: 2024-06-20

## 2024-06-20 DIAGNOSIS — Z87.898 PERSONAL HISTORY OF OTHER SPECIFIED CONDITIONS: ICD-10-CM

## 2024-06-20 DIAGNOSIS — Z12.12 ENCOUNTER FOR SCREENING FOR MALIGNANT NEOPLASM OF COLON: ICD-10-CM

## 2024-06-20 DIAGNOSIS — Z12.11 ENCOUNTER FOR SCREENING FOR MALIGNANT NEOPLASM OF COLON: ICD-10-CM

## 2024-10-10 ENCOUNTER — RESULT REVIEW (OUTPATIENT)
Age: 49
End: 2024-10-10

## 2024-10-10 ENCOUNTER — APPOINTMENT (OUTPATIENT)
Dept: MRI IMAGING | Facility: CLINIC | Age: 49
End: 2024-10-10
Payer: COMMERCIAL

## 2024-10-10 PROCEDURE — 72141 MRI NECK SPINE W/O DYE: CPT

## 2024-10-17 ENCOUNTER — RESULT REVIEW (OUTPATIENT)
Age: 49
End: 2024-10-17

## 2024-11-14 ENCOUNTER — APPOINTMENT (OUTPATIENT)
Dept: MRI IMAGING | Facility: CLINIC | Age: 49
End: 2024-11-14

## 2024-11-14 ENCOUNTER — APPOINTMENT (OUTPATIENT)
Dept: ULTRASOUND IMAGING | Facility: CLINIC | Age: 49
End: 2024-11-14

## 2024-11-14 ENCOUNTER — RESULT REVIEW (OUTPATIENT)
Age: 49
End: 2024-11-14

## 2024-11-14 ENCOUNTER — APPOINTMENT (OUTPATIENT)
Dept: MAMMOGRAPHY | Facility: CLINIC | Age: 49
End: 2024-11-14
Payer: COMMERCIAL

## 2024-11-14 PROCEDURE — 77062 BREAST TOMOSYNTHESIS BI: CPT

## 2024-11-14 PROCEDURE — 77066 DX MAMMO INCL CAD BI: CPT

## 2024-11-14 PROCEDURE — 73721 MRI JNT OF LWR EXTRE W/O DYE: CPT | Mod: RT

## 2025-07-31 ENCOUNTER — NON-APPOINTMENT (OUTPATIENT)
Age: 50
End: 2025-07-31

## 2025-09-03 ENCOUNTER — APPOINTMENT (OUTPATIENT)
Dept: OBGYN | Facility: CLINIC | Age: 50
End: 2025-09-03